# Patient Record
Sex: FEMALE | Race: WHITE | Employment: FULL TIME | URBAN - METROPOLITAN AREA
[De-identification: names, ages, dates, MRNs, and addresses within clinical notes are randomized per-mention and may not be internally consistent; named-entity substitution may affect disease eponyms.]

---

## 2020-07-19 ENCOUNTER — VIRTUAL VISIT (OUTPATIENT)
Dept: FAMILY MEDICINE | Facility: OTHER | Age: 30
End: 2020-07-19

## 2020-07-19 NOTE — PROGRESS NOTES
"Date: 2020 11:07:11  Clinician: Debo Harrington  Clinician NPI: [id number]  Patient: Gordy Rodriguez  Patient : 1990  Patient Address: [address] [place] [address]. [address]., [place] [address]  Patient Phone: [phone]  Visit Protocol: URI  Patient Summary:  Gordy is a 30 year old ( : 1990 ) female who initiated a Visit for COVID-19 (Coronavirus) evaluation and screening. When asked the question \"Please sign me up to receive news, health information and promotions. \", Gordy responded \"No\".    Gordy states her symptoms started gradually 3-4 days ago.   Her symptoms consist of nausea, diarrhea, rhinitis, malaise, a headache, chills, a sore throat, and nasal congestion. She is experiencing mild difficulty breathing with activities but can speak normally in full sentences. Gordy also feels feverish.   Symptom details     Nasal secretions: The color of her mucus is clear.    Sore throat: Gordy reports having mild throat pain (1-3 on a 10 point pain scale), does not have exudate on her tonsils, and can swallow liquids. The lymph nodes in her neck are not enlarged. A rash has not appeared on the skin since the sore throat started.     Temperature: Her current temperature is 98 degrees Fahrenheit.     Headache: She states the headache is severe (7-9 on a 10 point pain scale).      Gordy denies having wheezing, teeth pain, ageusia, vomiting, ear pain, enlarged lymph nodes, myalgias, anosmia, facial pain or pressure, and cough. She also denies having recent facial or sinus surgery in the past 60 days, taking antibiotic medication in the past month, having a sinus infection within the past year, and double sickening (worsening symptoms after initial improvement).   Precipitating events  Within the past week, Gordy has not been exposed to someone with strep throat. She has recently been exposed to someone with influenza. Gordy has been in close contact with the following high risk individuals: children under the age " of 5, people with asthma, heart disease or diabetes, and immunocompromised people.   Pertinent COVID-19 (Coronavirus) information  In the past 14 days, Gordy has not worked in a congregate living setting.   She does not work or volunteer as healthcare worker or a  and does not work or volunteer in a healthcare facility.   Gordy also has not lived in a congregate living setting in the past 14 days. She does not live with a healthcare worker.   Gordy has not had a close contact with a laboratory-confirmed COVID-19 patient within 14 days of symptom onset.   Pertinent medical history  Gordy does not get yeast infections when she takes antibiotics.   Gordy does not need a return to work/school note.   Weight: 148 lbs   Gordy does not smoke or use smokeless tobacco.   She denies pregnancy and denies breastfeeding. She does not menstruate.   Weight: 148 lbs    MEDICATIONS: Wellbutrin XL oral, melatonin oral, ALLERGIES: NKDA  Clinician Response:  Dear Gordy,   Your symptoms show that you may have coronavirus (COVID-19). This illness can cause fever, cough and trouble breathing. Many people get a mild case and get better on their own. Some people can get very sick.  What should I do?  We would like to test you for this virus.   1. Please call [phone] to schedule your visit. Explain that you were referred by [facility] to have a COVID-19 test. Be ready to share your [facility] visit ID number.  The following will serve as your written order for this COVID Test, ordered by me, for the indication of suspected COVID [Z20.828]: The test will be ordered in [facility], our electronic health record, after you are scheduled. It will show as ordered and authorized by Murtaza Dejesus MD.  Order: COVID-19 (Coronavirus) PCR for SYMPTOMATIC testing from [facility].      2. When it's time for your COVID test:  Stay at least 6 feet away from others. (If someone will drive you to your test, stay in the backseat, as far away from the  " as you can.)   Cover your mouth and nose with a mask, tissue or washcloth.  Go straight to the testing site. Don't make any stops on the way there or back.      3.Starting now: Stay home and away from others (self-isolate) until:   You've had no fever---and no medicine that reduces fever---for 3 full days (72 hours). And...   Your other symptoms have gotten better. For example, your cough or breathing has improved. And...   At least 10 days have passed since your symptoms started.       During this time, don't leave the house except for testing or medical care.   Stay in your own room, even for meals. Use your own bathroom if you can.   Stay away from others in your home. No hugging, kissing or shaking hands. No visitors.  Don't go to work, school or anywhere else.    Clean \"high touch\" surfaces often (doorknobs, counters, handles, etc.). Use a household cleaning spray or wipes. You'll find a full list of  on the EPA website: www.epa.gov/pesticide-registration/list-n-disinfectants-use-against-sars-cov-2.   Cover your mouth and nose with a mask, tissue or washcloth to avoid spreading germs.  Wash your hands and face often. Use soap and water.  Caregivers in these groups are at risk for severe illness due to COVID-19:  o People 65 years and older  o People who live in a nursing home or long-term care facility  o People with chronic disease (lung, heart, cancer, diabetes, kidney, liver, immunologic)  o People who have a weakened immune system, including those who:   Are in cancer treatment  Take medicine that weakens the immune system, such as corticosteroids  Had a bone marrow or organ transplant  Have an immune deficiency  Have poorly controlled HIV or AIDS  Are obese (body mass index of 40 or higher)  Smoke regularly   o Caregivers should wear gloves while washing dishes, handling laundry and cleaning bedrooms and bathrooms.  o Use caution when washing and drying laundry: Don't shake dirty laundry, " and use the warmest water setting that you can.  o For more tips, go to www.cdc.gov/coronavirus/2019-ncov/downloads/10Things.pdf.       How can I take care of myself?   Get lots of rest. Drink extra fluids (unless a doctor has told you not to).   Take Tylenol (acetaminophen) for fever or pain. If you have liver or kidney problems, ask your family doctor if it's okay to take Tylenol.   Adults can take either:    650 mg (two 325 mg pills) every 4 to 6 hours, or...   1,000 mg (two 500 mg pills) every 8 hours as needed.    Note: Don't take more than 3,000 mg in one day. Acetaminophen is found in many medicines (both prescribed and over-the-counter medicines). Read all labels to be sure you don't take too much.   For children, check the Tylenol bottle for the right dose. The dose is based on the child's age or weight.    If you have other health problems (like cancer, heart failure, an organ transplant or severe kidney disease): Call your specialty clinic if you don't feel better in the next 2 days.       Know when to call 911. Emergency warning signs include:    Trouble breathing or shortness of breath Pain or pressure in the chest that doesn't go away Feeling confused like you haven't felt before, or not being able to wake up Bluish-colored lips or face.  Where can I get more information?   [facility] -- About COVID-19: [email or web address]   CDC -- What to Do If You're Sick: www.cdc.gov/coronavirus/2019-ncov/about/steps-when-sick.html   CDC -- Ending Home Isolation: www.cdc.gov/coronavirus/2019-ncov/hcp/disposition-in-home-patients.html   CDC -- Caring for Someone: www.cdc.gov/coronavirus/2019-ncov/if-you-are-sick/care-for-someone.html   [facility] -- Interim Guidance for Hospital Discharge to Home: www.health.[facility].[place].us/diseases/coronavirus/hcp/hospdischarge.pdf   [facility] clinical trials (COVID-19 research studies): [email or web address].[facility]-clinical-trials    Below are the COVID-19  hotlines at the [place] [facility] ([facility]). Interpreters are available.    For health questions: Call [phone] or 1-565.604.5288 (7 a.m. to 7 p.m.) For questions about schools and childcare: Call [phone] or 1-983.416.6326 (7 a.m. to 7 p.m.)    Diagnosis: Nasal congestion  Diagnosis ICD: R09.81